# Patient Record
Sex: FEMALE | Race: WHITE | NOT HISPANIC OR LATINO
[De-identification: names, ages, dates, MRNs, and addresses within clinical notes are randomized per-mention and may not be internally consistent; named-entity substitution may affect disease eponyms.]

---

## 2017-10-11 ENCOUNTER — RESULT REVIEW (OUTPATIENT)
Age: 37
End: 2017-10-11

## 2019-03-18 ENCOUNTER — RESULT REVIEW (OUTPATIENT)
Age: 39
End: 2019-03-18

## 2019-12-03 PROBLEM — Z00.00 ENCOUNTER FOR PREVENTIVE HEALTH EXAMINATION: Status: ACTIVE | Noted: 2019-12-03

## 2020-01-23 ENCOUNTER — LABORATORY RESULT (OUTPATIENT)
Age: 40
End: 2020-01-23

## 2020-01-23 ENCOUNTER — APPOINTMENT (OUTPATIENT)
Dept: NEUROLOGY | Facility: CLINIC | Age: 40
End: 2020-01-23
Payer: COMMERCIAL

## 2020-01-23 VITALS
HEART RATE: 75 BPM | WEIGHT: 125 LBS | SYSTOLIC BLOOD PRESSURE: 99 MMHG | BODY MASS INDEX: 23 KG/M2 | DIASTOLIC BLOOD PRESSURE: 72 MMHG | TEMPERATURE: 98 F | HEIGHT: 62 IN

## 2020-01-23 DIAGNOSIS — Z84.2 FAMILY HISTORY OF OTHER DISEASES OF THE GENITOURINARY SYSTEM: ICD-10-CM

## 2020-01-23 DIAGNOSIS — Z78.9 OTHER SPECIFIED HEALTH STATUS: ICD-10-CM

## 2020-01-23 DIAGNOSIS — R51 HEADACHE: ICD-10-CM

## 2020-01-23 DIAGNOSIS — R47.89 OTHER SPEECH DISTURBANCES: ICD-10-CM

## 2020-01-23 PROCEDURE — 99204 OFFICE O/P NEW MOD 45 MIN: CPT

## 2020-01-23 NOTE — ASSESSMENT
[FreeTextEntry1] : Neurologic examination is normal. Pinprick was inconsistent but does not point to any etiology. I will get MRI of the brain. Labs as outlined below.I will consider repeating EMG. Further recommendations once test results are available.

## 2020-01-23 NOTE — HISTORY OF PRESENT ILLNESS
[FreeTextEntry1] : This is a 39-year-old right-handed woman who is being seen in neurologic consultation at the request of Dr. Garber for evaluation of numerous complaints. Patient states that over the last 2-3 years she has had a tightness in the palm of her right hand. She describes her right forearm and hand feeling fatigued. She describes it as a "awareness" of her right side. In the past she saw . he gave included MRI of the cervical spine as well as EMG. EMG confirms bilateral carpal tunnel syndrome which is very mild in degree. C-spine shows degenerative changes but no problems with the spinal cord. She says that she can do everything with her right arm but every so often she'll have trouble opening jars. She complains of constant fatigue. She had a sleep study done and has been diagnosed with idiopathic hypersomnia for which she is on stimulants as needed. With the modafinil she yawns a lot but does not need to sleep as much. Over the last few months she has noticed episodes where her fingers and toes become very painful. These symptoms are usually at the tip of her fingers and toes. Mild numbness is noted. No loss of strength is noted. When this happened last March she noted purplish blue spots in her fingertips. She did see a rheumatologist who did not find any autoimmune etiology to her complaints. Intermittently she has word finding difficulty which tends to be prominent when she is speaking to her children. She has mild headaches. These can become severe but typically she can handle them.

## 2020-01-23 NOTE — REASON FOR VISIT
[Consultation] : a consultation visit [FreeTextEntry1] : Right hand and feet numbness,can't find a word to say something,memory problem,sleepiness

## 2020-01-23 NOTE — PHYSICAL EXAM
[FreeTextEntry1] : Physical examination \par General: No acute distress, Awake, Alert. \par Fundus: Unable\par Neck: no Carotid bruit. \par Cardiovascular: Normal rate, Regular rhythm, No murmur. \par Chest - clear bilaterally\par \par Mental status \par Awake, alert, and oriented to person, time and place, Normal attention span and concentration, Recent and remote memory intact, Language intact, Fund of knowledge intact. \par Cranial Nerves \par II: VFF \par III, IV, VI: PERRL, EOMI. \par V: Facial sensation is normal B/L. \par VII: Facial strength is normal B/L. \par VIII: Gross hearing is intact. \par IX, X: Palate is midline and elevates symmetrically. \par XI: Trapezius normal strength. \par XII: Tongue midline without atrophy or fasciculations. \par \par Motor exam \par Muscle tone - no evidence of rigidity or resistance in all 4 extremities. \par No atrophy or fasciculations \par Muscle Strength: arms and legs, proximal and distal flexors and extensors are normal \par No UE drift.\par \par Reflexes \par All present, normal, and symmetrical. \par Plantars right: mute. \par Plantars left: mute. \par Absent ankle jerks. \par \par Coordination \par Finger to nose: Normal. \par Heel to shin: Normal. \par \par Sensory \par Intact sensation to vibration and cold.\par \par Gait \par Normal\par

## 2020-01-27 LAB
25(OH)D3 SERPL-MCNC: 48.5 NG/ML
ALBUMIN MFR SERPL ELPH: 52.4 %
ALBUMIN SERPL-MCNC: 3.4 G/DL
ALBUMIN/GLOB SERPL: 1.1 RATIO
ALBUPE: 11.3 %
ALPHA1 GLOB MFR SERPL ELPH: 7 %
ALPHA1 GLOB SERPL ELPH-MCNC: 0.5 G/DL
ALPHA1UPE: 42.4 %
ALPHA2 GLOB MFR SERPL ELPH: 11.8 %
ALPHA2 GLOB SERPL ELPH-MCNC: 0.8 G/DL
ALPHA2UPE: 17.9 %
B BURGDOR IGG+IGM SER QL IB: NORMAL
B-GLOBULIN MFR SERPL ELPH: 11.8 %
B-GLOBULIN SERPL ELPH-MCNC: 0.8 G/DL
BETAUPE: 17.5 %
CK SERPL-CCNC: 58 U/L
ESTIMATED AVERAGE GLUCOSE: 114 MG/DL
FOLATE SERPL-MCNC: 14.3 NG/ML
GAMMA GLOB FLD ELPH-MCNC: 1.1 G/DL
GAMMA GLOB MFR SERPL ELPH: 17 %
GAMMAUPE: 10.9 %
HBA1C MFR BLD HPLC: 5.6 %
HCYS SERPL-MCNC: 7.1 UMOL/L
IGA 24H UR QL IFE: NORMAL
INTERPRETATION SERPL IEP-IMP: NORMAL
KAPPA LC 24H UR QL: NORMAL
PROT PATTERN 24H UR ELPH-IMP: NORMAL
PROT SERPL-MCNC: 6.5 G/DL
PROT SERPL-MCNC: 6.5 G/DL
PROT UR-MCNC: 18 MG/DL
PROT UR-MCNC: 18 MG/DL
TSH SERPL-ACNC: 2.65 UIU/ML
VIT B12 SERPL-MCNC: 582 PG/ML

## 2020-01-29 LAB
METHYLMALONATE SERPL-SCNC: 88 NMOL/L
VIT B1 SERPL-MCNC: 94.8 NMOL/L

## 2020-02-03 LAB — VIT B6 SERPL-MCNC: 5.2 UG/L

## 2020-02-05 ENCOUNTER — RESULT REVIEW (OUTPATIENT)
Age: 40
End: 2020-02-05

## 2020-02-11 ENCOUNTER — APPOINTMENT (OUTPATIENT)
Dept: NEUROLOGY | Facility: CLINIC | Age: 40
End: 2020-02-11
Payer: COMMERCIAL

## 2020-02-11 PROCEDURE — 95819 EEG AWAKE AND ASLEEP: CPT

## 2020-02-24 ENCOUNTER — APPOINTMENT (OUTPATIENT)
Dept: NEUROLOGY | Facility: CLINIC | Age: 40
End: 2020-02-24
Payer: COMMERCIAL

## 2020-02-24 VITALS
TEMPERATURE: 98 F | SYSTOLIC BLOOD PRESSURE: 89 MMHG | DIASTOLIC BLOOD PRESSURE: 60 MMHG | WEIGHT: 125 LBS | HEIGHT: 62 IN | BODY MASS INDEX: 23 KG/M2 | HEART RATE: 86 BPM

## 2020-02-24 DIAGNOSIS — R29.898 OTHER SYMPTOMS AND SIGNS INVOLVING THE MUSCULOSKELETAL SYSTEM: ICD-10-CM

## 2020-02-24 PROCEDURE — 99212 OFFICE O/P EST SF 10 MIN: CPT

## 2020-02-26 PROBLEM — R29.898 RIGHT HAND WEAKNESS: Status: ACTIVE | Noted: 2020-01-23

## 2020-02-26 NOTE — ASSESSMENT
[FreeTextEntry1] : Neurologic examination is normal.\par The mildest abnormality is that her total protein is elevated in the urine. In the absence of any other abnormalities I think this is nonspecific.\par At this time, I do not find any structural or laboratory etiology to her complaints. I don't think that repeating an EMG at this juncture would be beneficial. She will see me on an as-needed basis.

## 2020-02-26 NOTE — DATA REVIEWED
[de-identified] : \par  [place] MRI Report             Final\par \par No Documents Attached\par \par \par \par \par   [facility]\par                                          [address]\par                                    [place], [place]  [address]\par                                        Department of Radiology\par                                             [phone]\par \par \par Patient Name:      MARY CARMEN KING                Location:       [facility]\par Med Rec #:        [id number]                    Account #:      [id number]\par YOB: 1980                    Ordering:       Diego Arambula MD\par Age: 39               Sex:    F                 Attending:      Diego Arambula MD\par PCP:        Michell Garber MD\par ______________________________________________________________________________________\par \par Exam Date:      02/05/20\par Exam:         MRI BRAIN WAW IC\par Order#:       MRI [id number]\par \par \par \par MRI OF THE BRAIN WITH AND WITHOUT CONTRAST\par \par  INDICATION:  Headache, numbness, word finding difficulty\par \par  TECHNIQUE: Multiplanar, multisequence MR images from the skull base to the vertex were obtained\par with without the administration of intravenous contrast.\par \par  CONTRAST: Gadavist 6 cc.\par \par  COMPARISON:  None\par \par  FINDINGS:\par  The ventricles are normal in caliber. Cortical sulcation pattern is unremarkable. The basal\par cisterns are patent.\par \par  There is no midline shift or focal mass effect.\par \par  There is no epidural or subdural hematoma. There is no parenchymal hemorrhage.\par \par  Gray-white matter differentiation is intact. There is no restricted diffusion.\par \par  No abnormal parenchymal or leptomeningeal enhancement.\par \par  The intracranial vasculature demonstrates T2 signal void related to vascular flow. The dural venous\par sinuses are well opacified with contrast.\par \par  The globes are symmetric and intact. The retrobulbar fat demonstrates homogenous fat signal\par intensity.\par \par  There is no expansion of the sella turcica. Craniocervical junction is patent. The brachium pontis\par is unremarkable. No CP angle mass.\par \par  The mastoids are aerated. Mild circumferential polypoid mucosal thickening involving the bilateral\par maxillary sinus. Mild mucoperiosteal thickening involving the right ethmoid air cells and right\par sphenoid sinus.\par \par  The calvaria is intact.\par \par \par \par  IMPRESSION:\par  No hydrocephalus, midline shift, intracranial hemorrhage or cerebral infarction.\par  No abnormal parenchymal or leptomeningeal enhancement.\par  Mild circumferential polypoid mucosal disease involving the bilateral maxillary sinus.\par \par ***Electronically Signed ***\par -----------------------------------------------\par Rasheed Correa MD              02/05/20 1134\par \par Dictated on 02/05/20 1015\par \par \par Report cc:  Diego Arambula MD;\par \par  \par \par  Ordered by: DIEGO ARAMBULA       Collected/Examined: [id number] 09:18AM       \par Verified by: DIEGO ARAMBULA [id number] 01:02PM       \par  Result Communication: No patient communication needed at this time;\par Stage: Final       \par  Performed at: [facility]       Resulted: [id number] 10:15AM       Last Updated: [id number] 01:02PM       Accession: [id number]

## 2020-02-26 NOTE — PHYSICAL EXAM
[FreeTextEntry1] : Physical examination \par General: No acute distress, Awake, Alert. \par Cardiovascular: Normal rate, Regular rhythm, No murmur. \par Chest - clear bilaterally\par \par Mental status \par Awake, alert, and oriented to person, time and place, Normal attention span and concentration, Recent and remote memory intact, Language intact, Fund of knowledge intact. \par Cranial Nerves \par II: VFF \par III, IV, VI: PERRL, EOMI. \par V: Facial sensation is normal B/L. \par VII: Facial strength is normal B/L. \par VIII: Gross hearing is intact. \par IX, X: Palate is midline and elevates symmetrically. \par XI: Trapezius normal strength. \par XII: Tongue midline without atrophy or fasciculations. \par \par Motor exam \par Muscle tone - no evidence of rigidity or resistance in all 4 extremities. \par No atrophy or fasciculations \par Muscle Strength: arms and legs, proximal and distal flexors and extensors are normal \par No UE drift.\par \par Coordination \par Finger to nose: Normal. \par Heel to shin: Normal. \par \par Gait \par Normal\par

## 2020-02-26 NOTE — HISTORY OF PRESENT ILLNESS
[FreeTextEntry1] : 2/24/2020\par Here in followup. We reviewed labs. MRI of the brain is normal.EEG is normal. No changes to health history.\par \par 1/23/2020\par This is a 39-year-old right-handed woman who is being seen in neurologic consultation at the request of Dr. Garber for evaluation of numerous complaints. Patient states that over the last 2-3 years she has had a tightness in the palm of her right hand. She describes her right forearm and hand feeling fatigued. She describes it as a "awareness" of her right side. In the past she saw  he gave included MRI of the cervical spine as well as EMG. EMG confirms bilateral carpal tunnel syndrome which is very mild in degree. C-spine shows degenerative changes but no problems with the spinal cord. She says that she can do everything with her right arm but every so often she'll have trouble opening jars. She complains of constant fatigue. She had a sleep study done and has been diagnosed with idiopathic hypersomnia for which she is on stimulants as needed. With the modafinil she yawns a lot but does not need to sleep as much. Over the last few months she has noticed episodes where her fingers and toes become very painful. These symptoms are usually at the tip of her fingers and toes. Mild numbness is noted. No loss of strength is noted. When this happened last March she noted purplish blue spots in her fingertips. She did see a rheumatologist who did not find any autoimmune etiology to her complaints. Intermittently she has word finding difficulty which tends to be prominent when she is speaking to her children. She has mild headaches. These can become severe but typically she can handle them.

## 2021-01-11 ENCOUNTER — APPOINTMENT (OUTPATIENT)
Dept: PULMONOLOGY | Facility: HOSPITAL | Age: 41
End: 2021-01-11
Payer: COMMERCIAL

## 2021-01-11 DIAGNOSIS — Z87.898 PERSONAL HISTORY OF OTHER SPECIFIED CONDITIONS: ICD-10-CM

## 2021-01-11 PROCEDURE — 99213 OFFICE O/P EST LOW 20 MIN: CPT

## 2021-01-11 RX ORDER — DESOGESTREL AND ETHINYL ESTRADIOL 0.15-0.03
0.15-3 KIT ORAL
Qty: 84 | Refills: 0 | Status: ACTIVE | COMMUNITY
Start: 2020-12-23

## 2021-01-11 NOTE — PHYSICAL EXAM
[General Appearance - Well Developed] : well developed [Normal Appearance] : normal appearance [General Appearance - Well Nourished] : well nourished [Heart Rate And Rhythm] : heart rate was normal and rhythm regular [Murmurs] : no murmurs [] : no respiratory distress [Exaggerated Use Of Accessory Muscles For Inspiration] : no accessory muscle use [Auscultation Breath Sounds / Voice Sounds] : lungs were clear to auscultation bilaterally [Bowel Sounds] : normal bowel sounds [FreeTextEntry1] : no edema [No Focal Deficits] : no focal deficits [Oriented To Time, Place, And Person] : oriented to person, place, and time [Affect] : the affect was normal

## 2021-01-11 NOTE — HISTORY OF PRESENT ILLNESS
[FreeTextEntry1] : PMD: Dr. Garber Michell [phone]\par \par History of present illness:\par 40 year-old woman with excessive daytime sleepiness has idiopathic hypersomnia on sleep test.  Patient is sleepy with [place] sleepiness score of 16- which improved to 5 with provigil 100 mg.\par She fells better during the day, able to drive without feeling sleepy.\par No side effects to provigil so far. \par Able to sleep 7 hrs. No snoring.\par No new medical problems.\par I discussed about birth control effectiveness on provigil.

## 2021-01-11 NOTE — ASSESSMENT
[FreeTextEntry1] : 38 -year-old woman with idiopathic hypersomnia. \par Patient is doing better with provigil, no side effects with provigil

## 2021-04-05 ENCOUNTER — APPOINTMENT (OUTPATIENT)
Dept: NEUROLOGY | Facility: CLINIC | Age: 41
End: 2021-04-05
Payer: COMMERCIAL

## 2021-04-05 VITALS
SYSTOLIC BLOOD PRESSURE: 98 MMHG | DIASTOLIC BLOOD PRESSURE: 66 MMHG | BODY MASS INDEX: 23.92 KG/M2 | WEIGHT: 130 LBS | HEIGHT: 62 IN | TEMPERATURE: 97.2 F | HEART RATE: 83 BPM

## 2021-04-05 PROCEDURE — 99072 ADDL SUPL MATRL&STAF TM PHE: CPT

## 2021-04-05 PROCEDURE — 99214 OFFICE O/P EST MOD 30 MIN: CPT

## 2021-04-08 NOTE — DATA REVIEWED
[de-identified] : \par  [place] MRI Report             Final\par \par No Documents Attached\par \par \par \par \par   [facility]\par                                          [address]\par                                    [place], [place]  [address]\par                                        Department of Radiology\par                                             [phone]\par \par \par Patient Name:      MARY CARMEN KING                Location:       [facility]\par Med Rec #:        [id number]                    Account #:      [id number]\par YOB: 1980                    Ordering:       Diego Arambula MD\par Age: 39               Sex:    F                 Attending:      Diego Arambula MD\par PCP:        Michell Garber MD\par ______________________________________________________________________________________\par \par Exam Date:      02/05/20\par Exam:         MRI BRAIN WAW IC\par Order#:       MRI [id number]\par \par \par \par MRI OF THE BRAIN WITH AND WITHOUT CONTRAST\par \par  INDICATION:  Headache, numbness, word finding difficulty\par \par  TECHNIQUE: Multiplanar, multisequence MR images from the skull base to the vertex were obtained\par with without the administration of intravenous contrast.\par \par  CONTRAST: Gadavist 6 cc.\par \par  COMPARISON:  None\par \par  FINDINGS:\par  The ventricles are normal in caliber. Cortical sulcation pattern is unremarkable. The basal\par cisterns are patent.\par \par  There is no midline shift or focal mass effect.\par \par  There is no epidural or subdural hematoma. There is no parenchymal hemorrhage.\par \par  Gray-white matter differentiation is intact. There is no restricted diffusion.\par \par  No abnormal parenchymal or leptomeningeal enhancement.\par \par  The intracranial vasculature demonstrates T2 signal void related to vascular flow. The dural venous\par sinuses are well opacified with contrast.\par \par  The globes are symmetric and intact. The retrobulbar fat demonstrates homogenous fat signal\par intensity.\par \par  There is no expansion of the sella turcica. Craniocervical junction is patent. The brachium pontis\par is unremarkable. No CP angle mass.\par \par  The mastoids are aerated. Mild circumferential polypoid mucosal thickening involving the bilateral\par maxillary sinus. Mild mucoperiosteal thickening involving the right ethmoid air cells and right\par sphenoid sinus.\par \par  The calvaria is intact.\par \par \par \par  IMPRESSION:\par  No hydrocephalus, midline shift, intracranial hemorrhage or cerebral infarction.\par  No abnormal parenchymal or leptomeningeal enhancement.\par  Mild circumferential polypoid mucosal disease involving the bilateral maxillary sinus.\par \par ***Electronically Signed ***\par -----------------------------------------------\par Rasheed Correa MD              02/05/20 1134\par \par Dictated on 02/05/20 1015\par \par \par Report cc:  Diego Arambula MD;\par \par  \par \par  Ordered by: DIEGO ARAMBULA       Collected/Examined: [id number] 09:18AM       \par Verified by: DIEGO ARAMBULA [id number] 01:02PM       \par  Result Communication: No patient communication needed at this time;\par Stage: Final       \par  Performed at: [facility]       Resulted: [id number] 10:15AM       Last Updated: [id number] 01:02PM       Accession: [id number]

## 2021-04-08 NOTE — HISTORY OF PRESENT ILLNESS
[FreeTextEntry1] : 4/5/21\kelechi Zuniga is here in followup. She continues to have a vibrating sensation internally. She describes feeling tremulous. This often wakes her up from sleep. She feels that either the bed is moving or she is moving. No focal or lateralizing neurologic signs.\par \par She continues to have excessive fatigue. She does not take modafinil routinely as it can also cause disturbances with being too awake at the end of the day. She also notices a crash at the end of the day. \par \par Continues to have intermittent numbness in her extremities. In the past had an EMG which showed bilateral carpal tunnel syndrome which is very mild.\par \par 2/24/2020\par Here in followup. We reviewed labs. MRI of the brain is normal.EEG is normal. No changes to health history.\par \par 1/23/2020\par This is a 39-year-old right-handed woman who is being seen in neurologic consultation at the request of Dr. Garber for evaluation of numerous complaints. Patient states that over the last 2-3 years she has had a tightness in the palm of her right hand. She describes her right forearm and hand feeling fatigued. She describes it as a "awareness" of her right side. In the past she saw Dr. maurice gave included MRI of the cervical spine as well as EMG. EMG confirms bilateral carpal tunnel syndrome which is very mild in degree. C-spine shows degenerative changes but no problems with the spinal cord. She says that she can do everything with her right arm but every so often she'll have trouble opening jars. She complains of constant fatigue. She had a sleep study done and has been diagnosed with idiopathic hypersomnia for which she is on stimulants as needed. With the modafinil she yawns a lot but does not need to sleep as much. Over the last few months she has noticed episodes where her fingers and toes become very painful. These symptoms are usually at the tip of her fingers and toes. Mild numbness is noted. No loss of strength is noted. When this happened last March she noted purplish blue spots in her fingertips. She did see a rheumatologist who did not find any autoimmune etiology to her complaints. Intermittently she has word finding difficulty which tends to be prominent when she is speaking to her children. She has mild headaches. These can become severe but typically she can handle them.

## 2021-04-08 NOTE — ASSESSMENT
[FreeTextEntry1] : Neurologic examination is Stable.\par \par She is willing to try Cymbalta to see if this helps the abnormal sensations she is experiencing. I will perform EMG/NCV to find out if there is any neuropathic etiology to her numbness.Based on this I will decide if we need to pursue a skin biopsy for small fiber nerve involvement. For now defer further blood work.

## 2021-04-15 ENCOUNTER — RESULT REVIEW (OUTPATIENT)
Age: 41
End: 2021-04-15

## 2021-05-27 ENCOUNTER — APPOINTMENT (OUTPATIENT)
Dept: NEUROLOGY | Facility: CLINIC | Age: 41
End: 2021-05-27
Payer: COMMERCIAL

## 2021-05-27 PROCEDURE — 95911 NRV CNDJ TEST 9-10 STUDIES: CPT

## 2021-05-27 PROCEDURE — 99072 ADDL SUPL MATRL&STAF TM PHE: CPT

## 2021-05-27 PROCEDURE — 95886 MUSC TEST DONE W/N TEST COMP: CPT

## 2021-10-28 ENCOUNTER — APPOINTMENT (OUTPATIENT)
Dept: NEUROLOGY | Facility: CLINIC | Age: 41
End: 2021-10-28
Payer: COMMERCIAL

## 2021-10-28 PROCEDURE — 11104 PUNCH BX SKIN SINGLE LESION: CPT

## 2021-10-28 PROCEDURE — 11105 PUNCH BX SKIN EA SEP/ADDL: CPT

## 2021-10-28 NOTE — PROCEDURE
[FreeTextEntry3] : A punch biopsy of the skin was performed to evaluate for small fiber neuropathy.\par After cleaning the skin with iodine prep, 3 cc total of 1% lidocaine without epinephrine were injected at 2 separate sites (right proximal thigh 20 cm below iliac spine, right calf 10 cm proximal to lateral malleolus).\par 2 punch biopsies were performed on the right thigh and calf.  \par Patient tolerated the procedure well. There was minimal blood loss.  There were no complications.\par She was advised to keep the biopsy sites covered and dry for 48 hours.\par

## 2022-01-05 ENCOUNTER — APPOINTMENT (OUTPATIENT)
Dept: NEUROLOGY | Facility: CLINIC | Age: 42
End: 2022-01-05

## 2022-01-20 ENCOUNTER — APPOINTMENT (OUTPATIENT)
Dept: NEUROLOGY | Facility: CLINIC | Age: 42
End: 2022-01-20
Payer: COMMERCIAL

## 2022-01-20 DIAGNOSIS — R20.0 ANESTHESIA OF SKIN: ICD-10-CM

## 2022-01-20 PROCEDURE — 99213 OFFICE O/P EST LOW 20 MIN: CPT | Mod: 95

## 2022-01-24 PROBLEM — R20.0 NUMBNESS: Status: ACTIVE | Noted: 2020-01-23

## 2022-01-24 NOTE — DATA REVIEWED
[de-identified] : \par  [place] MRI Report             Final\par \par No Documents Attached\par \par \par \par \par   [facility]\par                                          [address]\par                                    [place], [place]  [address]\par                                        Department of Radiology\par                                             [phone]\par \par \par Patient Name:      MARY CARMEN KING                Location:       [facility]\par Med Rec #:        [id number]                    Account #:      [id number]\par YOB: 1980                    Ordering:       Diego Arambula MD\par Age: 39               Sex:    F                 Attending:      Diego Arambula MD\par PCP:        Michell Garber MD\par ______________________________________________________________________________________\par \par Exam Date:      02/05/20\par Exam:         MRI BRAIN WAW IC\par Order#:       MRI [id number]\par \par \par \par MRI OF THE BRAIN WITH AND WITHOUT CONTRAST\par \par  INDICATION:  Headache, numbness, word finding difficulty\par \par  TECHNIQUE: Multiplanar, multisequence MR images from the skull base to the vertex were obtained\par with without the administration of intravenous contrast.\par \par  CONTRAST: Gadavist 6 cc.\par \par  COMPARISON:  None\par \par  FINDINGS:\par  The ventricles are normal in caliber. Cortical sulcation pattern is unremarkable. The basal\par cisterns are patent.\par \par  There is no midline shift or focal mass effect.\par \par  There is no epidural or subdural hematoma. There is no parenchymal hemorrhage.\par \par  Gray-white matter differentiation is intact. There is no restricted diffusion.\par \par  No abnormal parenchymal or leptomeningeal enhancement.\par \par  The intracranial vasculature demonstrates T2 signal void related to vascular flow. The dural venous\par sinuses are well opacified with contrast.\par \par  The globes are symmetric and intact. The retrobulbar fat demonstrates homogenous fat signal\par intensity.\par \par  There is no expansion of the sella turcica. Craniocervical junction is patent. The brachium pontis\par is unremarkable. No CP angle mass.\par \par  The mastoids are aerated. Mild circumferential polypoid mucosal thickening involving the bilateral\par maxillary sinus. Mild mucoperiosteal thickening involving the right ethmoid air cells and right\par sphenoid sinus.\par \par  The calvaria is intact.\par \par \par \par  IMPRESSION:\par  No hydrocephalus, midline shift, intracranial hemorrhage or cerebral infarction.\par  No abnormal parenchymal or leptomeningeal enhancement.\par  Mild circumferential polypoid mucosal disease involving the bilateral maxillary sinus.\par \par ***Electronically Signed ***\par -----------------------------------------------\par Rasheed Correa MD              02/05/20 1134\par \par Dictated on 02/05/20 1015\par \par \par Report cc:  Diego Arambula MD;\par \par  \par \par  Ordered by: DIEGO ARAMBULA       Collected/Examined: [id number] 09:18AM       \par Verified by: DIEGO ARAMBULA [id number] 01:02PM       \par  Result Communication: No patient communication needed at this time;\par Stage: Final       \par  Performed at: [facility]       Resulted: [id number] 10:15AM       Last Updated: [id number] 01:02PM       Accession: [id number]

## 2022-01-24 NOTE — HISTORY OF PRESENT ILLNESS
[Home] : at home, [unfilled] , at the time of the visit. [Medical Office: ([facility])___] : at the medical office located in  [Verbal consent obtained from patient] : the patient, [unfilled] [FreeTextEntry1] : Jose David is being seen via telehealth.  She continues to have intermittent sensation of vibration.  She does note it wakes her from sleep.  She feels that her symptoms have stabilized but she is not sure why.\par \par Small fiber neuropathy has been excluded based on biopsy.\par \par She continues to have idiopathic hypersomnia.  Does not take modafinil regularly because she does not want to be on a medication routinely.  She does take it on days where she feels like she will need it more.\par \par No other complaints.\par 4/5/21\par Nadia is here in followup. She continues to have a vibrating sensation internally. She describes feeling tremulous. This often wakes her up from sleep. She feels that either the bed is moving or she is moving. No focal or lateralizing neurologic signs.\par \par She continues to have excessive fatigue. She does not take modafinil routinely as it can also cause disturbances with being too awake at the end of the day. She also notices a crash at the end of the day. \par \par Continues to have intermittent numbness in her extremities. In the past had an EMG which showed bilateral carpal tunnel syndrome which is very mild.\par \par 2/24/2020\par Here in followup. We reviewed labs. MRI of the brain is normal.EEG is normal. No changes to health history.\par \par 1/23/2020\par This is a 39-year-old right-handed woman who is being seen in neurologic consultation at the request of Dr. Garber for evaluation of numerous complaints. Patient states that over the last 2-3 years she has had a tightness in the palm of her right hand. She describes her right forearm and hand feeling fatigued. She describes it as a "awareness" of her right side. In the past she saw  he gave included MRI of the cervical spine as well as EMG. EMG confirms bilateral carpal tunnel syndrome which is very mild in degree. C-spine shows degenerative changes but no problems with the spinal cord. She says that she can do everything with her right arm but every so often she'll have trouble opening jars. She complains of constant fatigue. She had a sleep study done and has been diagnosed with idiopathic hypersomnia for which she is on stimulants as needed. With the modafinil she yawns a lot but does not need to sleep as much. Over the last few months she has noticed episodes where her fingers and toes become very painful. These symptoms are usually at the tip of her fingers and toes. Mild numbness is noted. No loss of strength is noted. When this happened last March she noted purplish blue spots in her fingertips. She did see a rheumatologist who did not find any autoimmune etiology to her complaints. Intermittently she has word finding difficulty which tends to be prominent when she is speaking to her children. She has mild headaches. These can become severe but typically she can handle them.

## 2022-01-24 NOTE — PHYSICAL EXAM
[FreeTextEntry1] : Physical examination \par General: No acute distress, Awake, Alert. \par \par Mental status \par Awake, alert, and oriented to person, time and place, Normal attention span and concentration, Recent and remote memory intact, Language intact, Fund of knowledge intact. \par Cranial Nerves \par II: VFF \par III, IV, VI: PERRL, EOMI. \par V: Facial sensation is normal B/L. \par VII: Facial strength is normal B/L. \par VIII: Gross hearing is intact. \par

## 2022-01-24 NOTE — CONSULT LETTER
[Dear  ___] : Dear  [unfilled], [Consult Letter:] : I had the pleasure of evaluating your patient, [unfilled]. [Please see my note below.] : Please see my note below. [FreeTextEntry3] : Sincerely,\par \par Mukul Arambula M.D.\par

## 2023-01-18 ENCOUNTER — NON-APPOINTMENT (OUTPATIENT)
Age: 43
End: 2023-01-18

## 2023-01-18 ENCOUNTER — APPOINTMENT (OUTPATIENT)
Dept: BREAST CENTER | Facility: CLINIC | Age: 43
End: 2023-01-18
Payer: COMMERCIAL

## 2023-01-18 VITALS
BODY MASS INDEX: 23.92 KG/M2 | SYSTOLIC BLOOD PRESSURE: 114 MMHG | HEIGHT: 62 IN | DIASTOLIC BLOOD PRESSURE: 73 MMHG | HEART RATE: 81 BPM | WEIGHT: 130 LBS

## 2023-01-18 DIAGNOSIS — Z80.42 FAMILY HISTORY OF MALIGNANT NEOPLASM OF PROSTATE: ICD-10-CM

## 2023-01-18 PROCEDURE — 99203 OFFICE O/P NEW LOW 30 MIN: CPT

## 2023-01-18 RX ORDER — DULOXETINE HYDROCHLORIDE 20 MG/1
20 CAPSULE, DELAYED RELEASE PELLETS ORAL
Qty: 30 | Refills: 3 | Status: DISCONTINUED | COMMUNITY
Start: 2021-04-05 | End: 2023-01-18

## 2023-01-18 NOTE — ASSESSMENT
[FreeTextEntry1] : 41 yo female with left breast pain\par reviewed her risk status she is not high risk\par We reviewed risk reduction strategies including maintaining a BMI <25, limiting red meat intake and alcoholic beverages to 3 per week and exercise (150 min/ week low intensity or 75 min/week high intensity). And maintaining a normal vitamin D level.\par \par reviewed common etiologies of breast pain including caffeine, hormones and stress\par plan breast MRI asap\par also due for left sonogram 3/2023\par she will keep diary of breast pain as well\par if imaging negative and no further episodes of pain plan close interval f/u in 3 months to ensure resolution\par She knows to call or return sooner should any concerns or questions arise.\par

## 2023-01-18 NOTE — PHYSICAL EXAM
[Normocephalic] : normocephalic [Atraumatic] : atraumatic [Supple] : supple [No Supraclavicular Adenopathy] : no supraclavicular adenopathy [Examined in the supine and seated position] : examined in the supine and seated position [No dominant masses] : no dominant masses in right breast  [No dominant masses] : no dominant masses left breast [No Nipple Retraction] : no left nipple retraction [No Nipple Discharge] : no left nipple discharge [No Axillary Lymphadenopathy] : no left axillary lymphadenopathy [No Edema] : no edema [No Rashes] : no rashes [No Ulceration] : no ulceration [de-identified] : no skin changes, nipple discharge, nor masses

## 2023-01-18 NOTE — REVIEW OF SYSTEMS
[Joint Stiffness] : joint stiffness [Hand Stiffness] : stiffness of the hand [Itching] : itching [Breast Pain] : breast pain [Breast Itching] : breast itching [Negative] : Heme/Lymph

## 2023-01-18 NOTE — HISTORY OF PRESENT ILLNESS
[FreeTextEntry1] : This is a 41 yo female who was referred by Dr. Garber for left breast pain. Patient underwent bilateral screening mammogram on 09/15/22 which showed heterogeneously dense breasts, however no suspicious mass, microcalcifications, or other sign of malignancy. Bilateral breast ultrasound on 09/15/22 noted a 4 x 12 x 2 mm parallel hypoechoic duct versus lesion at 5:00 in the left breast retroareolar region. A left breast diagnostic targeted ultrasound was recommended for further evaluation. A left breast targeted ultrasound which was performed on 09/20/22 showed findings most consistent with a duct inspissated secretions in the left 5:00 retroareolar region. 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 any trauma, changes to herbs, diet or supplements.\par \par She does not perform SBE.\par She has not noticed a change in her breast or a breast lump.\par She has not noticed a change in her nipple or nipple area.\par She has not noticed a change in the skin of the breast.\par She is not experiencing nipple discharge.\par She is experiencing left breast pain.She describes it as daily can be a pulling or dull ache, started mid-december, 3/10 self-resolves lasts for about 30 minutes, can work through it.\par She has not noticed a lump or lymph node under the armpit.\par \par BREAST CANCER RISK FACTORS\par Menarche: 11\par Date of LMP: 1/3/2023\par Menopause: pre\par Grav: 4     Para: 4\par Age at first live birth: 26\par Nursed: Y\par Hysterectomy: N\par Oophorectomy: N\par OCP: Y currently 25 years\par HRT: N\par Last pap/pelvic exam: 5/2/2022 WNL \par Related family history: none\par Ashkenazi: N\par Mastery risk assessment: BRCAPRO 13.2% TCv7 11.4% TCv8 7.1% Li 10.7% Krish unavailable\par BRCA testing: N \par Bra size: 34B\par  \par Last mammogram: 09/15/22                 Location: [place]\par Report reviewed.                                 Images reviewed.\par Results: BI-RADS 2 - Heterogeneously dense breasts. No suspicious mass, microcalcifications, or other sign of malignancy.  \par \par Last ultrasound: 09/20/22                    Location: [place]\par Report reviewed.                                 Images reviewed.\par Results: Left breast targeted ultrasound - BI-RADS 3 - Probably benign - Findings most consistent with a duct inspissated secretions in the left 5:00 retroareolar region. 6-month follow-up targeted left breast ultrasound is recommended to ensure stability or resolution.\par \par Last ultrasound: 09/15/22                    Location: [place]\par Report reviewed.                                 Images reviewed.\par Results: Bilateral breast ultrasounds - BI-RADS 0 - Incomplete - Needs additional imaging. At 5:00 in the left retroareolar region there is a 4 x 12 x 2 mm parallel hypoechoic duct versus lesion. Left breast diagnostic targeted ultrasound is necessary at the current time for further evaluation. \par \par Last MRI:                                             Location:\par Report reviewed. \par Results:\par \par  \par \par

## 2023-01-18 NOTE — CONSULT LETTER
[Dear  ___] : Dear  [unfilled], [( Thank you for referring [unfilled] for consultation for _____ )] : Thank you for referring [unfilled] for consultation for [unfilled] [Please see my note below.] : Please see my note below. [Consult Closing:] : Thank you very much for allowing me to participate in the care of this patient.  If you have any questions, please do not hesitate to contact me. [Sincerely,] : Sincerely, [FreeTextEntry3] : Bonnie Iglesias MS DO\par Breast Surgeon\par [facility] \[place] [address]\par

## 2023-02-03 ENCOUNTER — NON-APPOINTMENT (OUTPATIENT)
Age: 43
End: 2023-02-03

## 2023-02-06 ENCOUNTER — RESULT REVIEW (OUTPATIENT)
Age: 43
End: 2023-02-06

## 2023-02-07 ENCOUNTER — NON-APPOINTMENT (OUTPATIENT)
Age: 43
End: 2023-02-07

## 2023-02-26 ENCOUNTER — RESULT REVIEW (OUTPATIENT)
Age: 43
End: 2023-02-26

## 2023-03-15 ENCOUNTER — APPOINTMENT (OUTPATIENT)
Dept: BREAST CENTER | Facility: CLINIC | Age: 43
End: 2023-03-15
Payer: COMMERCIAL

## 2023-03-15 VITALS
DIASTOLIC BLOOD PRESSURE: 69 MMHG | HEIGHT: 62 IN | HEART RATE: 74 BPM | BODY MASS INDEX: 23.92 KG/M2 | WEIGHT: 130 LBS | SYSTOLIC BLOOD PRESSURE: 103 MMHG

## 2023-03-15 DIAGNOSIS — Z78.9 OTHER SPECIFIED HEALTH STATUS: ICD-10-CM

## 2023-03-15 PROCEDURE — 99215 OFFICE O/P EST HI 40 MIN: CPT

## 2023-03-21 ENCOUNTER — NON-APPOINTMENT (OUTPATIENT)
Age: 43
End: 2023-03-21

## 2023-03-21 ENCOUNTER — RESULT REVIEW (OUTPATIENT)
Age: 43
End: 2023-03-21

## 2023-03-24 NOTE — PHYSICAL EXAM
[Normocephalic] : normocephalic [Atraumatic] : atraumatic [Supple] : supple [No Supraclavicular Adenopathy] : no supraclavicular adenopathy [Examined in the supine and seated position] : examined in the supine and seated position [No dominant masses] : no dominant masses in right breast  [No dominant masses] : no dominant masses left breast [No Nipple Retraction] : no left nipple retraction [No Nipple Discharge] : no left nipple discharge [No Axillary Lymphadenopathy] : no left axillary lymphadenopathy [No Edema] : no edema [No Rashes] : no rashes [No Ulceration] : no ulceration [Asymmetrical] : asymmetrical [de-identified] : R>L [de-identified] : healing bx site LOQ

## 2023-03-24 NOTE — HISTORY OF PRESENT ILLNESS
[FreeTextEntry1] : This is a 43 yo female who was referred by Dr. Garber for left breast pain. Patient underwent bilateral screening mammogram on 09/15/22 which showed heterogeneously dense breasts, however no suspicious mass, microcalcifications, or other sign of malignancy. Bilateral breast ultrasound on 09/15/22 noted a 4 x 12 x 2 mm parallel hypoechoic duct versus lesion at 5:00 in the left breast retroareolar region. A left breast diagnostic targeted ultrasound was recommended for further evaluation. A left breast targeted ultrasound which was performed on 09/20/22 showed findings most consistent with a duct inspissated secretions in the left 5:00 retroareolar region. 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 This area was felt to be discordant. And surgical biopsy was recommended.  She presents for consultation.\par \par She does not perform SBE.\par She has not noticed a change in her breast or a breast lump.\par She has not noticed a change in her nipple or nipple area.\par She has not noticed a change in the skin of the breast.\par She is not experiencing nipple discharge.\par She is experiencing left breast pain.She describes it as daily can be a pulling or dull ache, started mid-december, 3/10 self-resolves lasts for about 30 minutes, can work through it. Unchanged from prior\par She has not noticed a lump or lymph node under the armpit.\par \par BREAST CANCER RISK FACTORS\par Menarche: 11\par Date of LMP: 3/2023\par Menopause: pre\par Grav: 4     Para: 4\par Age at first live birth: 26\par Nursed: Y\par Hysterectomy: N\par Oophorectomy: N\par OCP: Y currently 23 years\par HRT: N\par Last pap/pelvic exam: 5/2/2022 WNL \par Related family history: none\par Ashkenazi: N\par Mastery risk assessment: BRCAPRO 13.2% TCv7 11.4% TCv8 7.1% Il 10.7% Krish unavailable\par BRCA testing: N \par Bra size: 34B\par  \par Last mammogram: 09/15/22                 Location: [place]\par Report reviewed.                                 Images reviewed.\par Results: BI-RADS 2 - Heterogeneously dense breasts. No suspicious mass, microcalcifications, or other sign of malignancy.  \par \par Last ultrasound: 09/20/22                    Location: [place]\par Report reviewed.                                 Images reviewed.\par Results: Left breast targeted ultrasound - BI-RADS 3 - Probably benign - Findings most consistent with a duct inspissated secretions in the left 5:00 retroareolar region. 6-month follow-up targeted left breast ultrasound is recommended to ensure stability or resolution.\par \par Last MRI: 2/7/2023                                            Location: [place]\par Report reviewed. \par Results: BIRADS 4A \par Left lower outer breast, about 4-5:00 location 5.4cm indeterminate linear nonmass enhancement for which MRI guided core needle biopsy is recommended. \par No MRI evidence of malignancy, right breast. No lymphadenopathy.

## 2023-03-24 NOTE — ASSESSMENT
[FreeTextEntry1] : 41 yo female with left breast pain now with abnormal left breast discordant biopsy\par reviewed films with several radiologists\par plan is for repeat left breast 5:00 USBX and go from there\par discussed the enhancement is close to nipple but not involving it\par discussed high suspicion for DCIS, grade 0 breast cancer\par implications for genetic testing/plastic surgery involvement \par will call once pathology available\par She knows to call or return sooner should any concerns or questions arise.\par \par \par

## 2023-03-24 NOTE — CONSULT LETTER
[Dear  ___] : Dear  [unfilled], [Please see my note below.] : Please see my note below. [Consult Closing:] : Thank you very much for allowing me to participate in the care of this patient.  If you have any questions, please do not hesitate to contact me. [Sincerely,] : Sincerely, [Courtesy Letter:] : I had the pleasure of seeing your patient, [unfilled], in my office today. [FreeTextEntry1] : Her work up continues! She will have another biopsy and then will deem whether surgical excision is needed and if so how much. [FreeTextEntry3] : Bonnie Iglesias MS DO\par Breast Surgeon\par [facility] \[place] [address]\par

## 2023-04-24 ENCOUNTER — NON-APPOINTMENT (OUTPATIENT)
Age: 43
End: 2023-04-24

## 2023-05-05 ENCOUNTER — APPOINTMENT (OUTPATIENT)
Dept: BREAST CENTER | Facility: CLINIC | Age: 43
End: 2023-05-05
Payer: COMMERCIAL

## 2023-05-05 VITALS
DIASTOLIC BLOOD PRESSURE: 67 MMHG | HEART RATE: 75 BPM | SYSTOLIC BLOOD PRESSURE: 102 MMHG | HEIGHT: 62 IN | WEIGHT: 130 LBS | BODY MASS INDEX: 23.92 KG/M2

## 2023-05-05 DIAGNOSIS — R92.8 OTHER ABNORMAL AND INCONCLUSIVE FINDINGS ON DIAGNOSTIC IMAGING OF BREAST: ICD-10-CM

## 2023-05-05 PROCEDURE — 99215 OFFICE O/P EST HI 40 MIN: CPT

## 2023-05-05 NOTE — PHYSICAL EXAM
[Normocephalic] : normocephalic [Atraumatic] : atraumatic [Supple] : supple [No Supraclavicular Adenopathy] : no supraclavicular adenopathy [Examined in the supine and seated position] : examined in the supine and seated position [Asymmetrical] : asymmetrical [No dominant masses] : no dominant masses in right breast  [No dominant masses] : no dominant masses left breast [No Nipple Retraction] : no left nipple retraction [No Nipple Discharge] : no left nipple discharge [No Axillary Lymphadenopathy] : no left axillary lymphadenopathy [No Edema] : no edema [No Rashes] : no rashes [No Ulceration] : no ulceration [de-identified] : R>L [de-identified] : healing bx site LOQ, with bruising

## 2023-05-05 NOTE — HISTORY OF PRESENT ILLNESS
[FreeTextEntry1] : This is a 43 yo female who was referred by Dr. Garber for left breast pain. Patient underwent bilateral screening mammogram on 09/15/22 which showed heterogeneously dense breasts, however no suspicious mass, microcalcifications, or other sign of malignancy. Bilateral breast ultrasound on 09/15/22 noted a 4 x 12 x 2 mm parallel hypoechoic duct versus lesion at 5:00 in the left breast retroareolar region. A left breast diagnostic targeted ultrasound was recommended for further evaluation. A left breast targeted ultrasound which was performed on 09/20/22 showed findings most consistent with a duct inspissated secretions in the left 5:00 retroareolar region. 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 This area was felt to be discordant. And surgical biopsy was recommended.  \par She requested second opinion radiology review at [facility]. She had subsequent left breast 5N2 and left breast 5 retro USGbx with benign pathology findings. Results were felt to be concordant and follow up MRI was recommended. The patient is doing well from her biopsies.\par \par She does not perform SBE.\par She has not noticed a change in her breast or a breast lump.\par She has not noticed a change in her nipple or nipple area.\par She has not noticed a change in the skin of the breast.\par She is not experiencing nipple discharge.\par She is experiencing left breast pain.She describes it as daily can be a pulling or dull ache, started mid-december, 3/10 self-resolves lasts for about 30 minutes, can work through it. Unchanged from prior\par She has not noticed a lump or lymph node under the armpit.\par \par BREAST CANCER RISK FACTORS\par Menarche: 11\par Date of LMP: 4/2023\par Menopause: pre\par Grav: 4     Para: 4\par Age at first live birth: 26\par Nursed: Y\par Hysterectomy: N\par Oophorectomy: N\par OCP: Y currently 23 years\par HRT: N\par Last pap/pelvic exam: 5/2/2022 WNL has upcoming appointment  \par Related family history: none\par Ashkenazi: N\par Mastery risk assessment: BRCAPRO 13.2% TCv7 11.4% TCv8 7.1% Li 10.7% Krish unavailable\par BRCA testing: N \par Bra size: 34B\par  \par Last mammogram: 09/15/22                 Location: [place]\par Report reviewed.                                 Images reviewed.\par Results: BI-RADS 2 - Heterogeneously dense breasts. No suspicious mass, microcalcifications, or other sign of malignancy.  \par \par Last ultrasound: 3/22/2023 left                    Location: [place]\par Report reviewed.                                 Images reviewed.\par Results: BI-RADS 3\par The probable duct with inspissated secretions at 5:00 in the left retroareolar region is stable as compared with 9/2022.\par \par Last MRI: 2/7/2023                                            Location: [place]\par Report reviewed. \par Results: BIRADS 4A \par Left lower outer breast, about 4-5:00 location 5.4cm indeterminate linear nonmass enhancement for which MRI guided core needle biopsy is recommended. \par No MRI evidence of malignancy, right breast. No lymphadenopathy.

## 2023-05-05 NOTE — CONSULT LETTER
[Dear  ___] : Dear  [unfilled], [Courtesy Letter:] : I had the pleasure of seeing your patient, [unfilled], in my office today. [Please see my note below.] : Please see my note below. [Consult Closing:] : Thank you very much for allowing me to participate in the care of this patient.  If you have any questions, please do not hesitate to contact me. [Sincerely,] : Sincerely, [FreeTextEntry3] : Bonnie Iglesias MS DO\par Breast Surgeon\par [facility] \[place] [address]\par

## 2023-05-05 NOTE — ASSESSMENT
[FreeTextEntry1] : 43 yo female with left breast pain now with abnormal left breast discordant biopsy with subsequent benign biopsy\par reviewed recent pathology is benign d/w Dr. Alcala that likely benign pathology and concordant\par discussed observation vs surgical excision\par discussed the initial high suspicion for DCIS, grade 0 breast cancer but now that this area has been sampled in 2 separate locations likely benign\par she would like to plan follow up imaging in september with mg/mri\par reviewed common etiologies of breast pain including caffeine, hormones and stress\par will d/w Dr. Garber re: possible atypical chest pain\par She knows to call or return sooner should any concerns or questions arise.\par \par \par

## 2023-07-12 ENCOUNTER — APPOINTMENT (OUTPATIENT)
Dept: GASTROENTEROLOGY | Facility: CLINIC | Age: 43
End: 2023-07-12
Payer: COMMERCIAL

## 2023-07-12 VITALS
OXYGEN SATURATION: 100 % | HEIGHT: 60 IN | SYSTOLIC BLOOD PRESSURE: 104 MMHG | RESPIRATION RATE: 16 BRPM | WEIGHT: 130 LBS | HEART RATE: 95 BPM | BODY MASS INDEX: 25.52 KG/M2 | DIASTOLIC BLOOD PRESSURE: 66 MMHG

## 2023-07-12 DIAGNOSIS — Z12.11 ENCOUNTER FOR SCREENING FOR MALIGNANT NEOPLASM OF COLON: ICD-10-CM

## 2023-07-12 PROCEDURE — 99203 OFFICE O/P NEW LOW 30 MIN: CPT

## 2023-07-12 RX ORDER — SODIUM SULFATE, POTASSIUM SULFATE AND MAGNESIUM SULFATE 1.6; 3.13; 17.5 G/177ML; G/177ML; G/177ML
17.5-3.13-1.6 SOLUTION ORAL
Qty: 1 | Refills: 0 | Status: ACTIVE | COMMUNITY
Start: 2023-07-12 | End: 1900-01-01

## 2023-07-12 NOTE — CONSULT LETTER
[Dear  ___] : Dear  [unfilled], [Consult Letter:] : I had the pleasure of evaluating your patient, [unfilled]. [Please see my note below.] : Please see my note below. [Consult Closing:] : Thank you very much for allowing me to participate in the care of this patient.  If you have any questions, please do not hesitate to contact me. [Sincerely,] : Sincerely, [FreeTextEntry3] : Nida Naylor M.D.\par

## 2023-07-12 NOTE — ASSESSMENT
[FreeTextEntry1] : 43 yo F here for screening colonoscopy. she will schedule procedure once she is 43 years old and due for her initial screening.\par \par Will plan on a colonoscopy for screening.\par Explained the risks (including but not limited to cardiopulmonary / anesthesia complications, bleeding, infection, perforation, aspiration, missed lesions, internal organ injury), benefits, and alternatives. Preparation for the procedure was reviewed with the patient. The patient was informed that she/he would be given IV anesthesia by an anesthesiologist during the procedure. The patient was informed that a family member or friend must drive the patient following recovery from the procedure. The patient understands and agrees, all questions answered. Will use a suprep  bowel prep with clears the day prior. \par \par \par \par

## 2023-07-12 NOTE — HISTORY OF PRESENT ILLNESS
[FreeTextEntry1] : 43 yo F here for screening colonoscopy\par denies gi sx\par no family hx colon cancer\par \par Last colonoscopy:;none prior\par \par Soc: no tobacco or significant EtOH\par \par Family Hx: no significant GI family history including colon cancer, stomach cancer, IBD, celiac\par \par ROS:\par constitutional: no weight loss, fevers\par ENT: no deafness\par Eyes: no blindness\par Neck: no lymphadenopathy\par Chest: no shortness of breath, no cough\par Cardiac: no chest pain, no palpitations\par Vascular: no leg swelling\par GI: no abdominal pain, nausea, vomiting, diarrhea, constipation, rectal bleeding, melena, dysphagia,  unless otherwise noted in HPI\par : no dysuria, dark urine\par Skin: no rashes, lesions, jaundice\par Heme: no bleeding\par Endocrine: no DM  unless otherwise stated in HPI\par \par Physical Exam: (VS noted below)\par General: alert, comfortable, in no acute distress\par Eyes: normal sclera, anicteric\par Neck: normal, supple, no neck mass\par Pulm: no respiratory distress, clear to auscultation bilaterally \par Heart: RRR, normal S1 S2\par Abd: Soft, non-tender, non-distended, normal bowel sounds, no appreciable hepatosplenomegaly, no masses palpated\par Rectal: deferred\par Ext: warm and well perfused, no edema\par Skin: no rashes, no juandice\par Neuro: alert, grossly nonfocal\par \par Labs/imaging/prior endoscopic results were reviewed to the extent available and pertinent findings noted in HPI\par

## 2023-08-03 ENCOUNTER — APPOINTMENT (OUTPATIENT)
Dept: PULMONOLOGY | Facility: CLINIC | Age: 43
End: 2023-08-03
Payer: COMMERCIAL

## 2023-08-03 VITALS
WEIGHT: 130 LBS | DIASTOLIC BLOOD PRESSURE: 65 MMHG | SYSTOLIC BLOOD PRESSURE: 104 MMHG | HEIGHT: 60 IN | HEART RATE: 90 BPM | BODY MASS INDEX: 25.52 KG/M2

## 2023-08-03 DIAGNOSIS — G47.11 IDIOPATHIC HYPERSOMNIA WITH LONG SLEEP TIME: ICD-10-CM

## 2023-08-03 PROCEDURE — 99214 OFFICE O/P EST MOD 30 MIN: CPT

## 2023-08-03 RX ORDER — (CALCIUM, MAGNESIUM, POTASSIUM, AND SODIUM OXYBATES) .5; .5; .5; .5 G/ML; G/ML; G/ML; G/ML
500 SOLUTION ORAL
Qty: 2 | Refills: 0 | Status: ACTIVE | COMMUNITY
Start: 2023-08-03 | End: 1900-01-01

## 2023-08-03 NOTE — ASSESSMENT
[FreeTextEntry1] : 42-year-old woman with idiopathic hypersomnia and has undergone rheumatologic and neurological work-up with no etiology for idiopathic hypersomnia.  Patient clinically improved with Provigil but headaches were bothersome with the Provigil and she stopped taking it.  Currently she feels very sleepy during the day.  We talked about only approved medication which is Xywav.  Patient is willing to start xywav.  We will start at 2.5 g at night slowly increase it to 4.5 g as tolerated.  Patient will see me in few weeks to check the efficacy.

## 2023-09-21 ENCOUNTER — APPOINTMENT (OUTPATIENT)
Dept: PULMONOLOGY | Facility: CLINIC | Age: 43
End: 2023-09-21

## 2023-09-26 ENCOUNTER — NON-APPOINTMENT (OUTPATIENT)
Age: 43
End: 2023-09-26

## 2023-10-04 ENCOUNTER — RESULT REVIEW (OUTPATIENT)
Age: 43
End: 2023-10-04

## 2023-10-10 ENCOUNTER — NON-APPOINTMENT (OUTPATIENT)
Age: 43
End: 2023-10-10

## 2023-10-16 RX ORDER — MODAFINIL 100 MG/1
100 TABLET ORAL
Qty: 30 | Refills: 0 | Status: ACTIVE | COMMUNITY
Start: 2021-01-11 | End: 1900-01-01

## 2023-10-19 ENCOUNTER — RESULT REVIEW (OUTPATIENT)
Age: 43
End: 2023-10-19

## 2023-10-20 ENCOUNTER — APPOINTMENT (OUTPATIENT)
Dept: GASTROENTEROLOGY | Facility: HOSPITAL | Age: 43
End: 2023-10-20
Payer: COMMERCIAL

## 2023-10-20 ENCOUNTER — RESULT REVIEW (OUTPATIENT)
Age: 43
End: 2023-10-20

## 2023-10-20 PROCEDURE — 45385 COLONOSCOPY W/LESION REMOVAL: CPT

## 2023-11-01 ENCOUNTER — APPOINTMENT (OUTPATIENT)
Dept: BREAST CENTER | Facility: CLINIC | Age: 43
End: 2023-11-01
Payer: COMMERCIAL

## 2023-11-01 ENCOUNTER — TRANSCRIPTION ENCOUNTER (OUTPATIENT)
Age: 43
End: 2023-11-01

## 2023-11-01 VITALS
SYSTOLIC BLOOD PRESSURE: 109 MMHG | BODY MASS INDEX: 25.52 KG/M2 | DIASTOLIC BLOOD PRESSURE: 70 MMHG | HEIGHT: 60 IN | HEART RATE: 81 BPM | WEIGHT: 130 LBS

## 2023-11-01 DIAGNOSIS — Z12.31 ENCOUNTER FOR SCREENING MAMMOGRAM FOR MALIGNANT NEOPLASM OF BREAST: ICD-10-CM

## 2023-11-01 DIAGNOSIS — L80 VITILIGO: ICD-10-CM

## 2023-11-01 DIAGNOSIS — N64.4 MASTODYNIA: ICD-10-CM

## 2023-11-01 DIAGNOSIS — R92.30 DENSE BREASTS, UNSPECIFIED: ICD-10-CM

## 2023-11-01 DIAGNOSIS — N60.92 UNSPECIFIED BENIGN MAMMARY DYSPLASIA OF LEFT BREAST: ICD-10-CM

## 2023-11-01 PROCEDURE — 99214 OFFICE O/P EST MOD 30 MIN: CPT

## 2024-04-01 ENCOUNTER — NON-APPOINTMENT (OUTPATIENT)
Age: 44
End: 2024-04-01

## 2024-04-08 ENCOUNTER — RESULT REVIEW (OUTPATIENT)
Age: 44
End: 2024-04-08

## 2024-10-09 ENCOUNTER — RESULT REVIEW (OUTPATIENT)
Age: 44
End: 2024-10-09

## 2024-12-16 ENCOUNTER — APPOINTMENT (OUTPATIENT)
Dept: BREAST CENTER | Facility: CLINIC | Age: 44
End: 2024-12-16
Payer: COMMERCIAL

## 2024-12-16 VITALS
DIASTOLIC BLOOD PRESSURE: 64 MMHG | HEART RATE: 93 BPM | BODY MASS INDEX: 23.33 KG/M2 | WEIGHT: 130 LBS | HEIGHT: 62.6 IN | SYSTOLIC BLOOD PRESSURE: 91 MMHG

## 2024-12-16 DIAGNOSIS — R92.2 INCONCLUSIVE MAMMOGRAM: ICD-10-CM

## 2024-12-16 DIAGNOSIS — Z12.31 ENCOUNTER FOR SCREENING MAMMOGRAM FOR MALIGNANT NEOPLASM OF BREAST: ICD-10-CM

## 2024-12-16 DIAGNOSIS — N60.92 UNSPECIFIED BENIGN MAMMARY DYSPLASIA OF LEFT BREAST: ICD-10-CM

## 2024-12-16 DIAGNOSIS — R92.30 DENSE BREASTS, UNSPECIFIED: ICD-10-CM

## 2024-12-16 DIAGNOSIS — Z91.89 OTHER SPECIFIED PERSONAL RISK FACTORS, NOT ELSEWHERE CLASSIFIED: ICD-10-CM

## 2024-12-16 PROCEDURE — 99214 OFFICE O/P EST MOD 30 MIN: CPT

## 2025-04-04 ENCOUNTER — NON-APPOINTMENT (OUTPATIENT)
Age: 45
End: 2025-04-04